# Patient Record
Sex: FEMALE | URBAN - METROPOLITAN AREA
[De-identification: names, ages, dates, MRNs, and addresses within clinical notes are randomized per-mention and may not be internally consistent; named-entity substitution may affect disease eponyms.]

---

## 2018-08-28 ENCOUNTER — OFFICE VISIT (OUTPATIENT)
Dept: URBAN - METROPOLITAN AREA CLINIC 23 | Facility: CLINIC | Age: 63
End: 2018-08-28
Payer: COMMERCIAL

## 2018-08-28 DIAGNOSIS — E11.9 TYPE 2 DIABETES MELLITUS W/O COMPLICATION: ICD-10-CM

## 2018-08-28 DIAGNOSIS — H25.11 AGE-RELATED NUCLEAR CATARACT, RIGHT EYE: ICD-10-CM

## 2018-08-28 DIAGNOSIS — H43.23 CRYSTALLINE DEPOSITS IN VITREOUS BODY, BILATERAL: Primary | ICD-10-CM

## 2018-08-28 PROCEDURE — 92014 COMPRE OPH EXAM EST PT 1/>: CPT | Performed by: OPHTHALMOLOGY

## 2018-08-28 PROCEDURE — 92134 CPTRZ OPH DX IMG PST SGM RTA: CPT | Performed by: OPHTHALMOLOGY

## 2018-08-28 ASSESSMENT — INTRAOCULAR PRESSURE
OS: 18
OD: 18

## 2018-08-28 NOTE — IMPRESSION/PLAN
Impression: Type 2 diabetes mellitus w/o complication: G82.5. OU. Condition: established, stable. Plan: Discussed diagnosis in detail with patient. No treatment is required at this time. Will continue to observe condition and or symptoms. Discussed risks of progression. Emphasized blood sugar control. Call if [address] worsens. OCT shows: no edema OU.

## 2018-08-28 NOTE — IMPRESSION/PLAN
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 OD>>OS. Condition: stable OD post surgery, OS stable Plan:  Discussed diagnosis in detail with patient. No treatment is required at this time. Will continue to observe condition and or symptoms. Vision complaints OD seem to be from advancing cataracts OD. Recommend cat eval to further evaluate for possible cataract surgery. 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 Pt should proceed with cataract surgery OD, then come back to Dr Siomara Jenkins to re-evaluate for possible vitrectomy for the left eye.

## 2018-08-28 NOTE — IMPRESSION/PLAN
Impression: Age-related nuclear cataract, right eye: H25.11. OD. Condition: stable. Vision: vision affected. Plan: Cataracts account for the patient's complaints. Patient understands changing glasses will not improve vision. Patient desires to have surgery, recommend cataract evaluation for consideration of cataract surgery OD.

## 2018-10-29 ENCOUNTER — OFFICE VISIT (OUTPATIENT)
Dept: URBAN - METROPOLITAN AREA CLINIC 23 | Facility: CLINIC | Age: 63
End: 2018-10-29
Payer: COMMERCIAL

## 2018-10-29 DIAGNOSIS — H04.123 DRY EYE SYNDROME OF BILATERAL LACRIMAL GLANDS: ICD-10-CM

## 2018-10-29 PROCEDURE — 99214 OFFICE O/P EST MOD 30 MIN: CPT | Performed by: OPTOMETRIST

## 2018-10-29 ASSESSMENT — INTRAOCULAR PRESSURE
OS: 18
OD: 21

## 2018-10-29 ASSESSMENT — VISUAL ACUITY
OS: 20/20
OD: 20/50

## 2018-10-29 ASSESSMENT — KERATOMETRY
OS: 42.88
OD: 42.13

## 2018-10-29 NOTE — IMPRESSION/PLAN
Impression: Age-related nuclear cataract, bilateral: H25.13. Plan: Cataracts account for the patient's complaints. Discussed all risks, benefits, procedures and recovery. Patient understands changing glasses will not improve vision. Patient desires to have surgery, recommend phacoemulsification with intraocular lens. Start with OD, distance target, standard lens. OS up to surgeon.

## 2018-10-29 NOTE — IMPRESSION/PLAN
Impression: Crystalline deposits in vitreous body, left eye: H43.22. Plan: Discussed diagnosis in detail with patient. No treatment is required at this time. Will continue to observe condition and or symptoms. Patient instructed to call if condition gets worse.

## 2018-11-13 ENCOUNTER — PRE-OPERATIVE VISIT (OUTPATIENT)
Dept: URBAN - METROPOLITAN AREA CLINIC 23 | Facility: CLINIC | Age: 63
End: 2018-11-13
Payer: COMMERCIAL

## 2018-11-13 DIAGNOSIS — H25.13 AGE-RELATED NUCLEAR CATARACT, BILATERAL: Primary | ICD-10-CM

## 2018-11-13 PROCEDURE — 92136 OPHTHALMIC BIOMETRY: CPT | Performed by: OPHTHALMOLOGY

## 2018-11-13 ASSESSMENT — PACHYMETRY
OD: 24.58
OS: 24.25
OS: 2.92
OD: 3.09

## 2018-11-19 ENCOUNTER — OFFICE VISIT (OUTPATIENT)
Dept: URBAN - METROPOLITAN AREA CLINIC 23 | Facility: CLINIC | Age: 63
End: 2018-11-19
Payer: COMMERCIAL

## 2018-11-19 DIAGNOSIS — H43.22 CRYSTALLINE DEPOSITS IN VITREOUS BODY, LEFT EYE: ICD-10-CM

## 2018-11-19 PROCEDURE — 99214 OFFICE O/P EST MOD 30 MIN: CPT | Performed by: OPHTHALMOLOGY

## 2018-11-19 RX ORDER — OFLOXACIN 3 MG/ML
0.3 % SOLUTION/ DROPS OPHTHALMIC
Qty: 5 | Refills: 1 | Status: ACTIVE
Start: 2018-11-19

## 2018-11-19 RX ORDER — DUREZOL 0.5 MG/ML
0.05 % EMULSION OPHTHALMIC
Qty: 0 | Refills: 1 | Status: ACTIVE
Start: 2018-11-19

## 2018-11-19 ASSESSMENT — INTRAOCULAR PRESSURE
OD: 15
OS: 13

## 2018-12-27 ENCOUNTER — SURGERY (OUTPATIENT)
Dept: URBAN - METROPOLITAN AREA SURGERY 11 | Facility: SURGERY | Age: 63
End: 2018-12-27
Payer: COMMERCIAL

## 2018-12-27 PROCEDURE — 66984 XCAPSL CTRC RMVL W/O ECP: CPT | Performed by: OPHTHALMOLOGY

## 2018-12-28 ENCOUNTER — POST-OPERATIVE VISIT (OUTPATIENT)
Dept: URBAN - METROPOLITAN AREA CLINIC 23 | Facility: CLINIC | Age: 63
End: 2018-12-28

## 2018-12-28 DIAGNOSIS — Z09 ENCNTR FOR F/U EXAM AFT TRTMT FOR COND OTH THAN MALIG NEOPLM: Primary | ICD-10-CM

## 2018-12-28 PROCEDURE — 99024 POSTOP FOLLOW-UP VISIT: CPT | Performed by: OPTOMETRIST

## 2018-12-28 ASSESSMENT — INTRAOCULAR PRESSURE
OD: 15
OS: 15

## 2019-01-07 ENCOUNTER — POST-OPERATIVE VISIT (OUTPATIENT)
Dept: URBAN - METROPOLITAN AREA CLINIC 23 | Facility: CLINIC | Age: 64
End: 2019-01-07

## 2019-01-07 PROCEDURE — 99024 POSTOP FOLLOW-UP VISIT: CPT | Performed by: OPTOMETRIST

## 2019-01-07 ASSESSMENT — INTRAOCULAR PRESSURE
OD: 20
OS: 18

## 2019-01-29 ENCOUNTER — POST-OPERATIVE VISIT (OUTPATIENT)
Dept: URBAN - METROPOLITAN AREA CLINIC 23 | Facility: CLINIC | Age: 64
End: 2019-01-29

## 2019-01-29 PROCEDURE — 99024 POSTOP FOLLOW-UP VISIT: CPT | Performed by: OPTOMETRIST

## 2019-01-29 ASSESSMENT — INTRAOCULAR PRESSURE
OD: 25
OD: 21
OS: 20

## 2023-03-24 ENCOUNTER — OFFICE VISIT (OUTPATIENT)
Dept: URBAN - METROPOLITAN AREA CLINIC 23 | Facility: CLINIC | Age: 68
End: 2023-03-24
Payer: COMMERCIAL

## 2023-03-24 DIAGNOSIS — H43.22 CRYSTALLINE DEPOSITS IN VITREOUS BODY, LEFT EYE: ICD-10-CM

## 2023-03-24 DIAGNOSIS — H43.811 VITREOUS DEGENERATION, RIGHT EYE: Primary | ICD-10-CM

## 2023-03-24 DIAGNOSIS — H25.12 AGE-RELATED NUCLEAR CATARACT OF LEFT EYE: ICD-10-CM

## 2023-03-24 PROCEDURE — 99203 OFFICE O/P NEW LOW 30 MIN: CPT | Performed by: OPTOMETRIST

## 2023-03-24 ASSESSMENT — INTRAOCULAR PRESSURE
OD: 20
OS: 20

## 2023-03-24 ASSESSMENT — KERATOMETRY
OD: 42.00
OS: 42.63

## 2023-03-24 NOTE — IMPRESSION/PLAN
Impression: Age-related nuclear cataract of left eye: H25.12. Left. Condition: established, stable. Plan: Discussed findings. Mild cataract, no treatment necessary. Monitor annually, sooner with any vision changes.

## 2023-03-24 NOTE — IMPRESSION/PLAN
Impression: Crystalline deposits in vitreous body, left eye: H43.22. Left. Condition: established, stable. Plan: Stable asteroid hyalosis, documented on OPTOS photos. Monitor annually, sooner with any vision changes.

## 2023-03-24 NOTE — IMPRESSION/PLAN
Impression: Vitreous degeneration, right eye: H43.811. Right. Condition: will likely improve. Plan: Discussed findings, ordered and reviewed OPTOS photos. PVD accounts for patient's complaints, no further retinal pathology noted. Signs and symptoms of RT/RD discussed, patient to call immediately with any noted symptoms. Monitor annually, sooner with any vision changes.

## 2024-10-16 ENCOUNTER — OFFICE VISIT (OUTPATIENT)
Dept: URBAN - METROPOLITAN AREA CLINIC 23 | Facility: CLINIC | Age: 69
End: 2024-10-16
Payer: MEDICARE

## 2024-10-16 DIAGNOSIS — H43.22 CRYSTALLINE DEPOSITS IN VITREOUS BODY, LEFT EYE: ICD-10-CM

## 2024-10-16 DIAGNOSIS — H25.11 AGE-RELATED NUCLEAR CATARACT, RIGHT EYE: ICD-10-CM

## 2024-10-16 DIAGNOSIS — E11.9 TYPE 2 DIABETES MELLITUS W/O COMPLICATION: Primary | ICD-10-CM

## 2024-10-16 DIAGNOSIS — H35.722 SEROUS DETACHMENT OF RETINAL PIGMENT EPITHELIUM, LEFT EYE: ICD-10-CM

## 2024-10-16 DIAGNOSIS — Z96.1 PRESENCE OF INTRAOCULAR LENS: ICD-10-CM

## 2024-10-16 PROCEDURE — 99214 OFFICE O/P EST MOD 30 MIN: CPT

## 2024-10-16 PROCEDURE — 92134 CPTRZ OPH DX IMG PST SGM RTA: CPT

## 2024-10-16 ASSESSMENT — KERATOMETRY
OD: 41.38
OS: 42.38

## 2024-10-16 ASSESSMENT — INTRAOCULAR PRESSURE
OS: 24
OD: 25

## 2024-10-16 ASSESSMENT — VISUAL ACUITY
OD: 20/20
OS: 20/20

## 2024-11-14 ENCOUNTER — OFFICE VISIT (OUTPATIENT)
Dept: URBAN - METROPOLITAN AREA CLINIC 23 | Facility: CLINIC | Age: 69
End: 2024-11-14
Payer: MEDICARE

## 2024-11-14 DIAGNOSIS — H43.22 CRYSTALLINE DEPOSITS IN VITREOUS BODY, LEFT EYE: ICD-10-CM

## 2024-11-14 DIAGNOSIS — H25.12 AGE-RELATED NUCLEAR CATARACT, LEFT EYE: Primary | ICD-10-CM

## 2024-11-14 PROCEDURE — 99204 OFFICE O/P NEW MOD 45 MIN: CPT | Performed by: OPHTHALMOLOGY

## 2024-11-14 ASSESSMENT — INTRAOCULAR PRESSURE
OD: 20
OS: 20

## 2024-11-14 ASSESSMENT — VISUAL ACUITY
OS: 20/20
OD: 20/20

## 2024-11-14 ASSESSMENT — KERATOMETRY
OS: 42.63
OD: 42.00

## 2024-12-04 ENCOUNTER — TECH ONLY (OUTPATIENT)
Dept: URBAN - METROPOLITAN AREA CLINIC 23 | Facility: CLINIC | Age: 69
End: 2024-12-04
Payer: MEDICARE

## 2024-12-04 DIAGNOSIS — H25.12 AGE-RELATED NUCLEAR CATARACT, LEFT EYE: Primary | ICD-10-CM

## 2024-12-04 ASSESSMENT — PACHYMETRY
OD: 5.18
OS: 2.82
OS: 24.26
OD: 24.54

## 2024-12-21 ENCOUNTER — POST-OPERATIVE VISIT (OUTPATIENT)
Dept: URBAN - METROPOLITAN AREA CLINIC 23 | Facility: CLINIC | Age: 69
End: 2024-12-21
Payer: MEDICARE

## 2024-12-21 DIAGNOSIS — Z96.1 PRESENCE OF INTRAOCULAR LENS: Primary | ICD-10-CM

## 2024-12-21 PROCEDURE — 99024 POSTOP FOLLOW-UP VISIT: CPT

## 2024-12-21 ASSESSMENT — INTRAOCULAR PRESSURE: OS: 19

## 2024-12-30 ENCOUNTER — POST-OPERATIVE VISIT (OUTPATIENT)
Dept: URBAN - METROPOLITAN AREA CLINIC 23 | Facility: CLINIC | Age: 69
End: 2024-12-30
Payer: MEDICARE

## 2024-12-30 DIAGNOSIS — Z96.1 PRESENCE OF INTRAOCULAR LENS: Primary | ICD-10-CM

## 2024-12-30 ASSESSMENT — INTRAOCULAR PRESSURE
OS: 19
OD: 19

## 2024-12-30 ASSESSMENT — KERATOMETRY
OS: 42.00
OD: 41.50

## 2025-01-20 ENCOUNTER — POST-OPERATIVE VISIT (OUTPATIENT)
Dept: URBAN - METROPOLITAN AREA CLINIC 23 | Facility: CLINIC | Age: 70
End: 2025-01-20
Payer: MEDICARE

## 2025-01-20 DIAGNOSIS — Z48.810 ENCOUNTER FOR SURGICAL AFTERCARE FOLLOWING SURGERY ON A SENSE ORGAN: Primary | ICD-10-CM

## 2025-01-20 RX ORDER — PREDNISOLONE ACETATE 10 MG/ML
1 % SUSPENSION/ DROPS OPHTHALMIC
Qty: 5 | Refills: 0 | Status: ACTIVE
Start: 2025-01-20

## 2025-01-20 ASSESSMENT — KERATOMETRY
OS: 42.25
OD: 42.13

## 2025-01-20 ASSESSMENT — VISUAL ACUITY
OS: 20/40
OD: 20/20

## 2025-01-20 ASSESSMENT — INTRAOCULAR PRESSURE
OD: 17
OS: 17

## 2025-02-17 ENCOUNTER — POST-OPERATIVE VISIT (OUTPATIENT)
Dept: URBAN - METROPOLITAN AREA CLINIC 23 | Facility: CLINIC | Age: 70
End: 2025-02-17
Payer: MEDICARE

## 2025-02-17 DIAGNOSIS — Z48.810 ENCOUNTER FOR SURGICAL AFTERCARE FOLLOWING SURGERY ON A SENSE ORGAN: Primary | ICD-10-CM

## 2025-02-17 PROCEDURE — 99024 POSTOP FOLLOW-UP VISIT: CPT

## 2025-02-17 ASSESSMENT — KERATOMETRY
OD: 41.75
OS: 43.00

## 2025-02-17 ASSESSMENT — INTRAOCULAR PRESSURE
OD: 24
OS: 23